# Patient Record
Sex: MALE | Race: WHITE | NOT HISPANIC OR LATINO | Employment: FULL TIME | URBAN - METROPOLITAN AREA
[De-identification: names, ages, dates, MRNs, and addresses within clinical notes are randomized per-mention and may not be internally consistent; named-entity substitution may affect disease eponyms.]

---

## 2017-07-19 ENCOUNTER — OFFICE VISIT (OUTPATIENT)
Dept: INTERNAL MEDICINE | Facility: CLINIC | Age: 42
End: 2017-07-19

## 2017-07-19 VITALS
OXYGEN SATURATION: 94 % | HEART RATE: 110 BPM | DIASTOLIC BLOOD PRESSURE: 70 MMHG | HEIGHT: 74 IN | SYSTOLIC BLOOD PRESSURE: 100 MMHG | BODY MASS INDEX: 29.26 KG/M2 | WEIGHT: 228 LBS

## 2017-07-19 DIAGNOSIS — K21.9 GASTROESOPHAGEAL REFLUX DISEASE WITHOUT ESOPHAGITIS: ICD-10-CM

## 2017-07-19 DIAGNOSIS — M54.6 PAIN IN THORACIC SPINE: ICD-10-CM

## 2017-07-19 DIAGNOSIS — I80.02 THROMBOPHLEBITIS OF SUPERFICIAL VEINS OF LEFT LOWER EXTREMITY: Primary | ICD-10-CM

## 2017-07-19 DIAGNOSIS — R06.02 SOB (SHORTNESS OF BREATH): ICD-10-CM

## 2017-07-19 PROBLEM — G89.29 CHRONIC BILATERAL LOW BACK PAIN: Status: ACTIVE | Noted: 2017-07-19

## 2017-07-19 PROBLEM — M54.50 CHRONIC BILATERAL LOW BACK PAIN: Status: ACTIVE | Noted: 2017-07-19

## 2017-07-19 PROCEDURE — 99205 OFFICE O/P NEW HI 60 MIN: CPT | Performed by: INTERNAL MEDICINE

## 2017-07-19 RX ORDER — OXYCODONE AND ACETAMINOPHEN 10; 325 MG/1; MG/1
1 TABLET ORAL EVERY 6 HOURS PRN
COMMUNITY
End: 2021-01-21

## 2017-07-19 RX ORDER — OMEPRAZOLE 40 MG/1
40 CAPSULE, DELAYED RELEASE ORAL DAILY
COMMUNITY

## 2017-07-19 NOTE — PROGRESS NOTES
Patient is a 42 y.o. male who is here to establish care.  Chief Complaint   Patient presents with   • Establish Care         HPI:  Here to establish care.  Today c/o increased SOB.  Past 4 days has had increased Left leg pain and tenderness.  Had LLE US showing Superficial thrombophlebitis.  More sob.  Was started on Eliquis today.  Energy level is not the best.  Some dizziness.  Yesterday had diarrhea.      History:    Patient Active Problem List   Diagnosis   • Pain in thoracic spine   • Gastroesophageal reflux disease without esophagitis   • Chronic bilateral low back pain   • Thrombophlebitis of superficial veins of left lower extremity   • SOB (shortness of breath)       Past Medical History:   Diagnosis Date   • Blood clot in vein    • Thrombophlebitis        Past Surgical History:   Procedure Laterality Date   • KNEE SURGERY      multiple   • SACRAL NERVE STIMULATOR PLACEMENT     • SHOULDER SURGERY         No current outpatient prescriptions on file prior to visit.     No current facility-administered medications on file prior to visit.        Family History   Problem Relation Age of Onset   • Diabetes Father    • Hypertension Father    • Colon cancer Mother    • Ovarian cancer Mother        Social History     Social History   • Marital status:      Spouse name: N/A   • Number of children: N/A   • Years of education: N/A     Occupational History   • Not on file.     Social History Main Topics   • Smoking status: Never Smoker   • Smokeless tobacco: Not on file   • Alcohol use No   • Drug use: Not on file   • Sexual activity: Not on file     Other Topics Concern   • Not on file     Social History Narrative   • No narrative on file         ROS:    Review of Systems   Constitutional: Positive for fatigue. Negative for chills, fever and unexpected weight change.   HENT: Negative for congestion, ear pain, hearing loss, rhinorrhea, sinus pressure, sore throat and trouble swallowing.    Eyes: Negative for  "discharge and itching.   Respiratory: Positive for shortness of breath. Negative for cough and chest tightness.    Cardiovascular: Positive for leg swelling. Negative for chest pain and palpitations.   Gastrointestinal: Negative for abdominal pain, blood in stool, constipation, diarrhea and vomiting.   Endocrine: Negative for polydipsia and polyuria.   Genitourinary: Negative for difficulty urinating, dysuria, enuresis, frequency, hematuria and urgency.   Musculoskeletal: Positive for arthralgias and back pain. Negative for gait problem and joint swelling.   Skin: Negative for rash and wound.   Allergic/Immunologic: Negative for immunocompromised state.   Neurological: Negative for dizziness, syncope, weakness, light-headedness, numbness and headaches.   Hematological: Does not bruise/bleed easily.   Psychiatric/Behavioral: Negative for behavioral problems, dysphoric mood and sleep disturbance. The patient is not nervous/anxious.        /70  Pulse 68  Ht 74\" (188 cm)  Wt 228 lb (103 kg)  BMI 29.27 kg/m2    Physical Exam:    Physical Exam   Constitutional: He is oriented to person, place, and time. He appears well-developed and well-nourished.   HENT:   Head: Normocephalic and atraumatic.   Right Ear: External ear normal.   Left Ear: External ear normal.   Mouth/Throat: Oropharynx is clear and moist.   Eyes: Conjunctivae and EOM are normal.   Neck: Normal range of motion. Neck supple.   Cardiovascular: Normal rate, regular rhythm and normal heart sounds.    Pulmonary/Chest: Effort normal and breath sounds normal.   Abdominal: Soft. Bowel sounds are normal.   Musculoskeletal: Normal range of motion. He exhibits tenderness (left LE cord).   Lymphadenopathy:     He has no cervical adenopathy.   Neurological: He is alert and oriented to person, place, and time.   Skin: Skin is warm and dry.   Psychiatric: He has a normal mood and affect. His behavior is normal. Thought content normal. "       Procedure:      Discussion/Summary:  Superficial thrombophlebitis-cont anticoagulation  SOB/tachy-will get STAT CTA of chest, if positive will need full anticoag  GERD-cont ppi  OIC-cont movantik  Chronic pain-per pain mgmt  ?hypercoag state-will get hypercoag w/u  High risk meds-labs today    High complexity decision making and STAT radiologic testing      Current Outpatient Prescriptions:   •  Naloxegol Oxalate (MOVANTIK) 12.5 MG tablet, Take  by mouth., Disp: , Rfl:   •  omeprazole (priLOSEC) 40 MG capsule, Take 40 mg by mouth Daily., Disp: , Rfl:   •  oxyCODONE-acetaminophen (PERCOCET)  MG per tablet, Take 1 tablet by mouth Every 6 (Six) Hours As Needed for Moderate Pain ., Disp: , Rfl:         Beck was seen today for establish care.    Diagnoses and all orders for this visit:    Thrombophlebitis of superficial veins of left lower extremity    SOB (shortness of breath)    Gastroesophageal reflux disease without esophagitis    Pain in thoracic spine

## 2024-12-03 ENCOUNTER — READMISSION MANAGEMENT (OUTPATIENT)
Dept: CALL CENTER | Facility: HOSPITAL | Age: 49
End: 2024-12-03
Payer: COMMERCIAL

## 2024-12-04 NOTE — OUTREACH NOTE
Prep Survey      Flowsheet Row Responses   [facility] facility patient discharged from? Non-BH   Is LACE score < 7 ? Non-BH Discharge   Eligibility Not Eligible   What are the reasons patient is not eligible? Other  [[place] [facility] [place]]   Does the patient have one of the following disease processes/diagnoses(primary or secondary)? Other   Prep survey completed? Yes            DEEPA JESUS - Registered Nurse